# Patient Record
(demographics unavailable — no encounter records)

---

## 2025-06-19 NOTE — ASSESSMENT
[FreeTextEntry1] : 14-year-old male here for evaluation of his left ring finger.  Patient reports pain and swelling at the PIP joint of the left ring finger for quite some time.  Patient reports he had an injury many months ago to this left ring finger that which caused initial pain and swelling.  He then reinjured his left ring finger 2 weeks ago which reaggravated his pain and swelling at the PIP joint.  He also reports he is unable to fully extend at the PIP joint since the most recent injury.  Physical examination left ring finger: Mild swelling appreciated at the PIP joint.  Unable to fully extend at the PIP joint.  Unable to fully flex at the PIP joint.  Mild tenderness to palpation at the PIP joint.  Sensation is intact distally.  Neurovascularly intact.  No deformity.  No malrotation or deviation.  X-rays left ring finger taken in the office today: Questionable calcification on the lateral aspect of the left ring finger that could represent an old fracture/injury versus indeterminate bony growth.  PIP joint is congruent.  TX: X-rays were discussed in depth.  I am recommending an MRI of the left ring finger to further evaluate the tendons and ligaments at the PIP joint.  His finger was splinted in the neutral position at this time at the PIP joint.  Red flag symptoms were discussed. Will give the patient a close follow-up for discussion of MRI results and for further evaluation and treatment.

## 2025-07-03 NOTE — ASSESSMENT
[FreeTextEntry1] : 14-year-old male here for evaluation of his left ring finger.  Patient reports pain and swelling at the PIP joint of the left ring finger for quite some time.  Patient reports he had an injury many months ago to this left ring finger that which caused initial pain and swelling.  He then reinjured his left ring finger 4 weeks ago which reaggravated his pain and swelling at the PIP joint.  He also reports he is unable to fully extend at the PIP joint since the most recent injury.  I sent the patient for an MRI which confirmed a focal injury at the fourth PIP with a mild flexion deformity accompanied by a moderate UCL sprain and RCL sprain  Physical examination left ring finger: Mild swelling appreciated at the PIP joint.  Unable to fully extend at the PIP joint.  Unable to fully flex at the PIP joint.  Mild tenderness to palpation at the PIP joint.  Sensation is intact distally.  Neurovascularly intact.  No deformity.  No malrotation or deviation.  X-rays left ring finger taken in the office today: Questionable calcification on the lateral aspect of the left ring finger that could represent an old fracture/injury versus indeterminate bony growth.  PIP joint is congruent.  MRI confirmed a focal injury at the fourth PIP with a mild flexion deformity accompanied by a moderate UCL sprain and RCL sprain.  MRI also reports a possible small nondisplaced fracture.  TX: X-rays were discussed in depth.  Patient is feeling much improved overall.  His pain and range of motion are improving.  He does still have a mild flexion deformity.  MRI results were discussed.  X-rays were discussed in depth.  I offered therapy but the patient, declined.  Patient will work on range of motion and strengthening on his own.  Will give him a follow-up in 6 weeks for repeat evaluation and treatment.  If he is not improving we will send him for therapy. All questions and concerns addressed to patient's satisfaction. Patient expresses full understanding of treatment plan.